# Patient Record
Sex: FEMALE | Race: WHITE | NOT HISPANIC OR LATINO | Employment: UNEMPLOYED
[De-identification: names, ages, dates, MRNs, and addresses within clinical notes are randomized per-mention and may not be internally consistent; named-entity substitution may affect disease eponyms.]

---

## 2017-05-26 ENCOUNTER — HOSPITAL (OUTPATIENT)
Dept: OTHER | Age: 52
End: 2017-05-26
Attending: SPECIALIST

## 2020-05-29 ENCOUNTER — HOSPITAL ENCOUNTER (EMERGENCY)
Age: 55
Discharge: HOME | End: 2020-05-29
Attending: EMERGENCY MEDICINE

## 2020-05-29 ENCOUNTER — APPOINTMENT (OUTPATIENT)
Dept: URGENT CARE | Age: 55
End: 2020-05-29

## 2020-05-29 VITALS
RESPIRATION RATE: 16 BRPM | OXYGEN SATURATION: 100 % | TEMPERATURE: 98.3 F | SYSTOLIC BLOOD PRESSURE: 130 MMHG | DIASTOLIC BLOOD PRESSURE: 75 MMHG | BODY MASS INDEX: 18.2 KG/M2 | HEART RATE: 89 BPM | WEIGHT: 115.96 LBS | HEIGHT: 67 IN

## 2020-05-29 DIAGNOSIS — R21 RASH: Primary | ICD-10-CM

## 2020-05-29 PROCEDURE — 99282 EMERGENCY DEPT VISIT SF MDM: CPT

## 2020-05-29 SDOH — HEALTH STABILITY: MENTAL HEALTH: HOW OFTEN DO YOU HAVE A DRINK CONTAINING ALCOHOL?: NEVER

## 2020-05-29 ASSESSMENT — ENCOUNTER SYMPTOMS
ABDOMINAL PAIN: 0
HEADACHES: 0
COUGH: 0
ADENOPATHY: 0
CHILLS: 0
AGITATION: 0
BACK PAIN: 0
HALLUCINATIONS: 0

## 2020-06-12 NOTE — ED NOTES
PER KATRIN AT [facility], PT WILL BE EVALUATED AFTER 1930 WHEN NEXT STAFF MEMBER ARRIVES.

## 2020-06-12 NOTE — ED NOTES
Pt other brother Laina Velez called wanting to talk to the doctor regarding pt admission for inpatient psych treatment. Nicola Wayne states that his grandmothers information is useless and that admission would be much worse for the patient.  Nicola Wayne can be reac

## 2020-06-12 NOTE — ED PROVIDER NOTES
Patient Seen in: [facility] Emergency Department      History   Patient presents with:  Eval-P    Stated Complaint: EVAL P    HPI  Patient is 50-year-old female presents our department via EMS for paranoid behavior.  Presents with  Signed Petition wh Smokeless tobacco: Never Used    Alcohol use: Not Currently    Drug use: No             Review of Systems    Positive for stated complaint: EVAL P  Other systems are as noted in HPI. Constitutional and vital signs reviewed.       All other systems reviewed components within normal limits   RAPID SARS-COV-2 BY PCR - Normal   CBC WITH DIFFERENTIAL WITH PLATELET    Narrative: The following orders were created for panel order CBC WITH DIFFERENTIAL WITH PLATELET.   Procedure                               Abnor pm    Follow-up:  [facility]  [address] [address] [phone]  Schedule an appointment as soon as possible for a visit in 3 days            Medications Prescribed:  Current Discharge Medication List

## 2020-06-12 NOTE — ED INITIAL ASSESSMENT (HPI)
Pt here as per EMS PD did petition because pt has been anxious with ideation as per family. Pt has been making statements that have been documented such as alluminum foil on legs to block electrical impulses.

## 2020-06-12 NOTE — ED NOTES
Pt daughter, Adrien Aragon, called and RN allowed pt to talk to her. Adrien Aragon was very tearful on the phone, \"I don't understand how this is happening, my grandmother has anger towards my mom and my mom is not a danger. \" RN advised Adrien Aragon that ONE family membe

## 2020-06-12 NOTE — ED INITIAL ASSESSMENT (HPI)
While in room assessing patient, pt Psychiatrist, Dr. Meet Coon called. Pt placed Dr on speaker phone and Dr. Meet Coon stated \"I'm shocked. \" Pt saw her psychiatrist on Monday and had no concerns at that time.  Pt states she did have aluminum toxicity and she i

## 2020-06-12 NOTE — ED NOTES
This ALEJANDRO and Rebecca Rizzo had lengthy conversation with patients brother who states he is an  and has knowledge basically from his mom.  Pt brother showed this RN and Dequan Baron pictures of the home where the outlets were disassembled, covered with
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
## 2020-06-13 NOTE — ED NOTES
Pt will be discharged to the waiting room to charge her phone. Nay, , will assist patient to get a ride from the ED. Pt is calm and cooperative.

## 2021-09-28 ENCOUNTER — HOSPITAL ENCOUNTER (OUTPATIENT)
Age: 56
Discharge: HOME | End: 2021-09-28
Payer: COMMERCIAL

## 2021-09-28 VITALS
SYSTOLIC BLOOD PRESSURE: 153 MMHG | WEIGHT: 116.88 LBS | RESPIRATION RATE: 16 BRPM | HEART RATE: 86 BPM | DIASTOLIC BLOOD PRESSURE: 68 MMHG | BODY MASS INDEX: 18 KG/M2 | OXYGEN SATURATION: 98 % | TEMPERATURE: 98 F

## 2021-09-28 DIAGNOSIS — S06.0X0A CLOSED HEAD INJURY WITH CONCUSSION, WITHOUT LOSS OF CONSCIOUSNESS, INITIAL ENCOUNTER: ICD-10-CM
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
## 2021-09-28 PROCEDURE — 99203 OFFICE O/P NEW LOW 30 MIN: CPT | Performed by: PHYSICIAN ASSISTANT

## 2021-09-28 RX ORDER — LEVOTHYROXINE SODIUM 0.1 MG/1
100 TABLET ORAL
COMMUNITY

## 2021-09-28 RX ORDER — ONDANSETRON 4 MG/1
4 TABLET, ORALLY DISINTEGRATING ORAL EVERY 4 HOURS PRN
Qty: 10 TABLET | Refills: 0 | Status: SHIPPED | OUTPATIENT
Start: 2021-09-28 | End: 2021-10-05
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
## 2022-12-21 ENCOUNTER — WALK IN (OUTPATIENT)
Dept: URGENT CARE | Age: 57
End: 2022-12-21

## 2022-12-21 VITALS
OXYGEN SATURATION: 99 % | RESPIRATION RATE: 16 BRPM | TEMPERATURE: 98.1 F | SYSTOLIC BLOOD PRESSURE: 142 MMHG | DIASTOLIC BLOOD PRESSURE: 87 MMHG | HEART RATE: 68 BPM

## 2022-12-21 DIAGNOSIS — K04.7 INFECTED TOOTH: ICD-10-CM

## 2022-12-21 DIAGNOSIS — K02.9 TOOTH DECAY: ICD-10-CM

## 2022-12-21 DIAGNOSIS — L50.0 ALLERGIC URTICARIA: Primary | ICD-10-CM

## 2022-12-21 DIAGNOSIS — R03.0 ELEVATED BLOOD-PRESSURE READING WITHOUT DIAGNOSIS OF HYPERTENSION: ICD-10-CM

## 2022-12-21 PROCEDURE — 99203 OFFICE O/P NEW LOW 30 MIN: CPT | Performed by: NURSE PRACTITIONER

## 2022-12-21 RX ORDER — PREDNISONE 20 MG/1
20 TABLET ORAL DAILY
Qty: 3 TABLET | Refills: 0 | Status: SHIPPED | OUTPATIENT
Start: 2022-12-21 | End: 2022-12-24

## 2022-12-21 RX ORDER — DIPHENHYDRAMINE HCL 25 MG
25 CAPSULE ORAL EVERY 6 HOURS PRN
COMMUNITY

## 2022-12-21 RX ORDER — AMOXICILLIN AND CLAVULANATE POTASSIUM 875; 125 MG/1; MG/1
1 TABLET, FILM COATED ORAL 2 TIMES DAILY
Qty: 14 TABLET | Refills: 0 | Status: SHIPPED | OUTPATIENT
Start: 2022-12-21 | End: 2022-12-28

## 2022-12-21 ASSESSMENT — ENCOUNTER SYMPTOMS
CONSTITUTIONAL NEGATIVE: 1
EYES NEGATIVE: 1
GASTROINTESTINAL NEGATIVE: 1
NEUROLOGICAL NEGATIVE: 1
PSYCHIATRIC NEGATIVE: 1
SORE THROAT: 1
RESPIRATORY NEGATIVE: 1

## 2023-02-15 ENCOUNTER — APPOINTMENT (OUTPATIENT)
Dept: URGENT CARE | Age: 58
End: 2023-02-15

## 2023-05-08 ENCOUNTER — HOSPITAL ENCOUNTER (OUTPATIENT)
Age: 58
Discharge: HOME | End: 2023-05-08
Attending: EMERGENCY MEDICINE
Payer: COMMERCIAL

## 2023-05-08 VITALS
OXYGEN SATURATION: 97 % | BODY MASS INDEX: 20 KG/M2 | TEMPERATURE: 98 F | SYSTOLIC BLOOD PRESSURE: 146 MMHG | HEART RATE: 95 BPM | DIASTOLIC BLOOD PRESSURE: 84 MMHG | RESPIRATION RATE: 18 BRPM | WEIGHT: 125 LBS

## 2023-05-08 DIAGNOSIS — R21 SKIN RASH: Primary | ICD-10-CM

## 2023-05-08 PROCEDURE — 99213 OFFICE O/P EST LOW 20 MIN: CPT

## 2023-05-08 PROCEDURE — 99212 OFFICE O/P EST SF 10 MIN: CPT

## 2023-05-08 NOTE — ED INITIAL ASSESSMENT (HPI)
Rash on face - started  Today. Pt used a new  Cover-up in liquid. Then she went to work and noted lines and  Redness, swelling, burning sensation. Pt her face and applied neosporin. Denies lip or tongue swelling .

## 2023-05-09 NOTE — DISCHARGE INSTRUCTIONS
Make sure to decontaminate any remaining wart cream off of your forearms with warm soapy water  Use a mild soap on your face like tone or Dove  You may apply a moisturizer. On the areas of redness and swelling, use topical over-the-counter hydrocortisone  You may take Claritin in the morning and Benadryl at nighttime for itchiness, burning, and swelling  You may apply a cool compress to the affected areas 20 minutes at a time. Do not freeze the skin    Follow-up with your primary care doctor  Consider follow-up with dermatology.

## 2023-11-10 ENCOUNTER — OFFICE VISIT (OUTPATIENT)
Dept: FAMILY MEDICINE CLINIC | Facility: CLINIC | Age: 58
End: 2023-11-10
Payer: COMMERCIAL

## 2023-11-10 VITALS
OXYGEN SATURATION: 98 % | TEMPERATURE: 98 F | RESPIRATION RATE: 16 BRPM | SYSTOLIC BLOOD PRESSURE: 130 MMHG | DIASTOLIC BLOOD PRESSURE: 80 MMHG | WEIGHT: 144 LBS | BODY MASS INDEX: 22.6 KG/M2 | HEART RATE: 78 BPM | HEIGHT: 67 IN

## 2023-11-10 DIAGNOSIS — N89.8 VAGINA ITCHING: ICD-10-CM

## 2023-11-10 DIAGNOSIS — N30.00 ACUTE CYSTITIS WITHOUT HEMATURIA: Primary | ICD-10-CM

## 2023-11-10 LAB
APPEARANCE: CLEAR
BILIRUBIN: NEGATIVE
GLUCOSE (URINE DIPSTICK): NEGATIVE MG/DL
KETONES (URINE DIPSTICK): 15 MG/DL
MULTISTIX LOT#: ABNORMAL NUMERIC
NITRITE, URINE: NEGATIVE
OCCULT BLOOD: NEGATIVE
PH, URINE: 6 (ref 4.5–8)
PROTEIN (URINE DIPSTICK): NEGATIVE MG/DL
SPECIFIC GRAVITY: 1.03 (ref 1–1.03)
URINE-COLOR: YELLOW
UROBILINOGEN,SEMI-QN: 0.2 MG/DL (ref 0–1.9)

## 2023-11-10 PROCEDURE — 3075F SYST BP GE 130 - 139MM HG: CPT | Performed by: PHYSICIAN ASSISTANT

## 2023-11-10 PROCEDURE — 3079F DIAST BP 80-89 MM HG: CPT | Performed by: PHYSICIAN ASSISTANT

## 2023-11-10 PROCEDURE — 87086 URINE CULTURE/COLONY COUNT: CPT | Performed by: PHYSICIAN ASSISTANT

## 2023-11-10 PROCEDURE — 3008F BODY MASS INDEX DOCD: CPT | Performed by: PHYSICIAN ASSISTANT

## 2023-11-10 PROCEDURE — 99213 OFFICE O/P EST LOW 20 MIN: CPT | Performed by: PHYSICIAN ASSISTANT

## 2023-11-10 PROCEDURE — 81003 URINALYSIS AUTO W/O SCOPE: CPT | Performed by: PHYSICIAN ASSISTANT

## 2023-11-10 RX ORDER — TRAZODONE HYDROCHLORIDE 50 MG/1
50 TABLET ORAL NIGHTLY
COMMUNITY

## 2023-11-10 RX ORDER — DEXTROAMPHETAMINE SACCHARATE, AMPHETAMINE ASPARTATE, DEXTROAMPHETAMINE SULFATE AND AMPHETAMINE SULFATE 5; 5; 5; 5 MG/1; MG/1; MG/1; MG/1
1 TABLET ORAL DAILY
COMMUNITY
End: 2023-11-10

## 2023-11-10 RX ORDER — CEPHALEXIN 500 MG/1
500 CAPSULE ORAL 2 TIMES DAILY
Qty: 14 CAPSULE | Refills: 0 | Status: SHIPPED | OUTPATIENT
Start: 2023-11-10 | End: 2023-11-17

## 2023-11-10 RX ORDER — FLUCONAZOLE 150 MG/1
TABLET ORAL
Qty: 2 TABLET | Refills: 0 | Status: SHIPPED | OUTPATIENT
Start: 2023-11-10

## 2023-11-10 RX ORDER — VALACYCLOVIR HYDROCHLORIDE 500 MG/1
TABLET, FILM COATED ORAL AS NEEDED
COMMUNITY

## 2023-11-10 RX ORDER — LISDEXAMFETAMINE DIMESYLATE CAPSULES 30 MG/1
30 CAPSULE ORAL EVERY MORNING
COMMUNITY

## 2023-11-10 RX ORDER — NITROFURANTOIN 25; 75 MG/1; MG/1
100 CAPSULE ORAL 2 TIMES DAILY
Qty: 14 CAPSULE | Refills: 0 | Status: SHIPPED | OUTPATIENT
Start: 2023-11-10 | End: 2023-11-10 | Stop reason: CLARIF

## 2023-11-10 NOTE — PATIENT INSTRUCTIONS
Push fluids   Start antibiotic and diflucan   Will call with test results   Please follow up with PCP if no improvement or if symptoms worsen